# Patient Record
Sex: MALE | Race: OTHER | HISPANIC OR LATINO | URBAN - METROPOLITAN AREA
[De-identification: names, ages, dates, MRNs, and addresses within clinical notes are randomized per-mention and may not be internally consistent; named-entity substitution may affect disease eponyms.]

---
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
## 2023-03-23 ENCOUNTER — EMERGENCY (EMERGENCY)
Facility: HOSPITAL | Age: 18
LOS: 1 days | Discharge: HOME | End: 2023-03-23
Attending: EMERGENCY MEDICINE
Payer: MEDICAID

## 2023-03-23 VITALS
WEIGHT: 236.78 LBS | OXYGEN SATURATION: 98 % | TEMPERATURE: 98 F | SYSTOLIC BLOOD PRESSURE: 145 MMHG | DIASTOLIC BLOOD PRESSURE: 74 MMHG | HEART RATE: 76 BPM | RESPIRATION RATE: 17 BRPM | HEIGHT: 73 IN

## 2023-03-23 PROBLEM — Z78.9 OTHER SPECIFIED HEALTH STATUS: Chronic | Status: ACTIVE | Noted: 2022-08-05

## 2023-03-23 PROCEDURE — 99283 EMERGENCY DEPT VISIT LOW MDM: CPT

## 2023-03-23 RX ORDER — AMOXICILLIN 250 MG/5ML
1 SUSPENSION, RECONSTITUTED, ORAL (ML) ORAL
Qty: 10 | Refills: 0
Start: 2023-03-23 | End: 2023-03-27

## 2023-03-23 NOTE — ED PROVIDER NOTE - NS ED ATTENDING STATEMENT MOD
This was a shared visit with the GRABIEL. I reviewed and verified the documentation and independently performed the documented:

## 2023-03-23 NOTE — ED ADULT NURSE NOTE - NS ED NURSE LEVEL OF CONSCIOUSNESS SPEECH
well developed, well nourished , in no acute distress , ambulating without difficulty , normal communication ability Speaking Coherently

## 2023-03-23 NOTE — ED PROVIDER NOTE - ATTENDING APP SHARED VISIT CONTRIBUTION OF CARE
Agree with NP H&P.  17 year old male with no significant PMHx presents to ED with R ear pain x4 days.  States he has a fullness in his ear and associated with coughing.  No fevers, chills or other complaints.  concern for possible otitis media.  Will dc with abx and PMD follow up.

## 2023-03-23 NOTE — ED PROVIDER NOTE - OBJECTIVE STATEMENT
17 year old male no signficant past history presents with mother for R ear pain x4 days.  States he has a fullness in his ear and associated with coughing.  No fevers, chills or other complaints.

## 2023-03-23 NOTE — ED PROVIDER NOTE - PATIENT PORTAL LINK FT
Patient tolerated procedure well.
You can access the FollowMyHealth Patient Portal offered by [facility] by registering at the following website: [email or web address]. By joining [facility]’s FollowMyHealth portal, you will also be able to view your health information using other applications (apps) compatible with our system.

## 2023-03-23 NOTE — ED PROVIDER NOTE - CLINICAL SUMMARY MEDICAL DECISION MAKING FREE TEXT BOX
Patient well appearing with redness in right TM.  Will treat for otitis media.  No malignant otitis externa, perforation of TM or other concerning diagnosis.  Return precautions provided and outpatient pmd f/u.  Patient agreeable.

## 2023-03-23 NOTE — ED PROVIDER NOTE - PHYSICAL EXAMINATION
GEN:   comfortable, in no apparent distress, AOx3  EYES:   PERRL, extra-occular movements intact  HEENT:   airway patent, moist mucosal membranes, uvula midline.  L ear normal.  R ear canal no inflammation, mild redness to TM of right ear.  CV:  RRR, Pulses- Radial: 2+ bilateral and equal  RESP:   non-labored, speaking in full sentences  MSK:   no musculoskeletal tenderness, 5/5 strength, moving all extremities  SKIN:   dry, intact, no rash  NEURO:   AOx3, no focal weakness or loss of sensation, gait normal, GCS 15  PSYCH: calm, cooperative, no apparent risk to self and others

## 2024-09-13 ENCOUNTER — EMERGENCY (EMERGENCY)
Facility: HOSPITAL | Age: 19
LOS: 1 days | Discharge: HOME | End: 2024-09-13
Attending: EMERGENCY MEDICINE
Payer: MEDICAID

## 2024-09-13 VITALS
TEMPERATURE: 98 F | DIASTOLIC BLOOD PRESSURE: 81 MMHG | SYSTOLIC BLOOD PRESSURE: 122 MMHG | RESPIRATION RATE: 18 BRPM | OXYGEN SATURATION: 98 % | HEART RATE: 86 BPM | WEIGHT: 250 LBS

## 2024-09-13 PROCEDURE — 90471 IMMUNIZATION ADMIN: CPT

## 2024-09-13 PROCEDURE — 12001 RPR S/N/AX/GEN/TRNK 2.5CM/<: CPT

## 2024-09-13 PROCEDURE — 73140 X-RAY EXAM OF FINGER(S): CPT

## 2024-09-13 PROCEDURE — 73140 X-RAY EXAM OF FINGER(S): CPT | Mod: 26,LT

## 2024-09-13 PROCEDURE — 90715 TDAP VACCINE 7 YRS/> IM: CPT

## 2024-09-13 PROCEDURE — 99283 EMERGENCY DEPT VISIT LOW MDM: CPT | Mod: 25

## 2024-09-13 PROCEDURE — 99284 EMERGENCY DEPT VISIT MOD MDM: CPT | Mod: 25

## 2024-09-13 RX ORDER — IBUPROFEN 600 MG
1 TABLET ORAL
Qty: 20 | Refills: 0
Start: 2024-09-13 | End: 2024-09-17

## 2024-09-13 RX ORDER — TETANUS TOXOID, REDUCED DIPHTHERIA TOXOID AND ACELLULAR PERTUSSIS VACCINE, ADSORBED 5; 2.5; 8; 8; 2.5 [IU]/.5ML; [IU]/.5ML; UG/.5ML; UG/.5ML; UG/.5ML
0.5 SUSPENSION INTRAMUSCULAR ONCE
Refills: 0 | Status: COMPLETED | OUTPATIENT
Start: 2024-09-13 | End: 2024-09-13

## 2024-09-13 RX ORDER — IBUPROFEN 600 MG
400 TABLET ORAL ONCE
Refills: 0 | Status: COMPLETED | OUTPATIENT
Start: 2024-09-13 | End: 2024-09-13

## 2024-09-13 RX ADMIN — TETANUS TOXOID, REDUCED DIPHTHERIA TOXOID AND ACELLULAR PERTUSSIS VACCINE, ADSORBED 0.5 MILLILITER(S): 5; 2.5; 8; 8; 2.5 SUSPENSION INTRAMUSCULAR at 03:06

## 2024-09-13 RX ADMIN — Medication 400 MILLIGRAM(S): at 03:02

## 2024-09-13 NOTE — ED ADULT NURSE NOTE - OBJECTIVE STATEMENT
Pt c/o pain has a laceration to left thumb. Pt stated "About a hour ago I was cutting some chilli's cooking and I cut my thumb.

## 2024-09-13 NOTE — ED PROVIDER NOTE - CLINICAL SUMMARY MEDICAL DECISION MAKING FREE TEXT BOX
19-year-old male who sustained a laceration of the left thumb while cutting vegetables with knife.  Unknown last tetanus.  Administered tetanus shot.  Laceration repaired, see procedural note.  X-ray shows no foreign body and no acute fractures.  Patient was given good return instructions.  Will have sutures removed in 1 week.

## 2024-09-13 NOTE — ED PROVIDER NOTE - OBJECTIVE STATEMENT
19-year-old male presents for evaluation of a laceration through the left thumb sustained while cutting vegetables this evening.  Unknown last tetanus.  Bleeding controlled.

## 2024-09-13 NOTE — ED ADULT NURSE NOTE - PAIN RATING/NUMBER SCALE (0-10): ACTIVITY
He has no symptoms of claudication, he is limited mostly because of arthritis in his knee.   4 (moderate pain)

## 2024-09-13 NOTE — ED PROVIDER NOTE - NSFOLLOWUPINSTRUCTIONS_ED_ALL_ED_FT
You were evaluated for a laceration of the left thumb which was repaired with sutures.  Evaluate wound daily for signs of infection including increased redness, pain, drainage, fever.  If you experience any of these return for further evaluation and management.  Go to urgent care, return here or follow-up with your primary care provider for suture removal in 7 days.  Take medications as prescribed for pain or discomfort.    Laceration    A laceration is a cut that goes through all of the layers of the skin and into the tissue that is right under the skin. Some lacerations heal on their own. Others need to be closed with skin adhesive strips, skin glue, stitches (sutures), or staples. Proper laceration care minimizes the risk of infection and helps the laceration to heal better.  If non-absorbable stitches or staples have been placed, they must be taken out within the time frame instructed by your healthcare provider.    SEEK IMMEDIATE MEDICAL CARE IF YOU HAVE ANY OF THE FOLLOWING SYMPTOMS: swelling around the wound, worsening pain, drainage from the wound, red streaking going away from your wound, inability to move finger or toe near the laceration, or discoloration of skin near the laceration.

## 2024-09-13 NOTE — ED ADULT NURSE NOTE - NSFALLUNIVINTERV_ED_ALL_ED
Bed/Stretcher in lowest position, wheels locked, appropriate side rails in place/Call bell, personal items and telephone in reach/Instruct patient to call for assistance before getting out of bed/chair/stretcher/Non-slip footwear applied when patient is off stretcher/[place] to call system/Physically safe environment - no spills, clutter or unnecessary equipment/Purposeful proactive rounding/Room/bathroom lighting operational, light cord in reach

## 2024-09-13 NOTE — ED ADULT NURSE NOTE - NSFALLRISKASMT_ED_ALL_ED_DT
----- Message from Lilly Johnston sent at 1/11/2017 11:39 AM CST -----  Contact: Lashay from [facility]  Calling to state weight 170 pounds this morning; on 12/26/16 179 pounds; 12/24/16 180 pounds; 2 plus pitting edema to left foot; valve clinic on Monday 1/16/17; wound has opened on left index but calling wound care clinic for orders; 302 blood glucose this morniing; patient does forgets about testing and taking medication for glucose levels. Please call [phone]. Thanks!    13-Sep-2024 02:07

## 2024-09-13 NOTE — ED PROVIDER NOTE - PHYSICAL EXAMINATION
General: Patient well appearing, vital signs within normal limits  HEENT: MMM, trachea midline  Respiratory: No respiratory distress  Cardiovascular: Less than 2-second capillary refill of the distal digit  Neuro: Moves all extremities, sensation grossly intact at the distal left thumb  MSK: Left upper extremity, left thumb: Laceration measuring approximately 1 cm through the volar aspect of the distal third arm and through the lateral nail fold into the nail  Skin:  see above

## 2024-09-13 NOTE — ED PROVIDER NOTE - PATIENT PORTAL LINK FT
You can access the FollowMyHealth Patient Portal offered by [facility] by registering at the following website: [email or web address]. By joining [facility]’s FollowMyHealth portal, you will also be able to view your health information using other applications (apps) compatible with our system.

## 2024-09-25 ENCOUNTER — EMERGENCY (EMERGENCY)
Facility: HOSPITAL | Age: 19
LOS: 1 days | Discharge: HOME | End: 2024-09-25
Attending: EMERGENCY MEDICINE
Payer: MEDICAID

## 2024-09-25 VITALS
OXYGEN SATURATION: 97 % | RESPIRATION RATE: 18 BRPM | HEART RATE: 69 BPM | DIASTOLIC BLOOD PRESSURE: 87 MMHG | SYSTOLIC BLOOD PRESSURE: 134 MMHG | WEIGHT: 250 LBS | HEIGHT: 72 IN | TEMPERATURE: 98 F

## 2024-09-25 PROCEDURE — G0463: CPT

## 2024-09-25 PROCEDURE — L9995: CPT

## 2024-11-13 ENCOUNTER — EMERGENCY (EMERGENCY)
Facility: HOSPITAL | Age: 19
LOS: 1 days | Discharge: HOME | End: 2024-11-13
Attending: STUDENT IN AN ORGANIZED HEALTH CARE EDUCATION/TRAINING PROGRAM
Payer: MEDICAID

## 2024-11-13 VITALS
HEIGHT: 72 IN | TEMPERATURE: 99 F | OXYGEN SATURATION: 97 % | WEIGHT: 250 LBS | SYSTOLIC BLOOD PRESSURE: 133 MMHG | DIASTOLIC BLOOD PRESSURE: 73 MMHG | HEART RATE: 98 BPM | RESPIRATION RATE: 19 BRPM

## 2024-11-13 PROCEDURE — 99284 EMERGENCY DEPT VISIT MOD MDM: CPT

## 2024-11-13 PROCEDURE — 99282 EMERGENCY DEPT VISIT SF MDM: CPT

## 2024-11-13 RX ORDER — LEVOCETIRIZINE DIHYDROCHLORIDE 5 MG/1
1 TABLET ORAL
Qty: 30 | Refills: 0
Start: 2024-11-13 | End: 2024-12-12

## 2024-11-13 RX ORDER — IPRATROPIUM BROMIDE 42 UG/1
2 SPRAY NASAL
Qty: 1 | Refills: 0
Start: 2024-11-13 | End: 2024-11-16

## 2024-11-15 ENCOUNTER — EMERGENCY (EMERGENCY)
Facility: HOSPITAL | Age: 19
LOS: 1 days | Discharge: HOME | End: 2024-11-15
Attending: EMERGENCY MEDICINE
Payer: MEDICAID

## 2024-11-15 VITALS
DIASTOLIC BLOOD PRESSURE: 72 MMHG | HEART RATE: 82 BPM | OXYGEN SATURATION: 98 % | RESPIRATION RATE: 16 BRPM | HEIGHT: 72 IN | WEIGHT: 250 LBS | TEMPERATURE: 98 F | SYSTOLIC BLOOD PRESSURE: 113 MMHG

## 2024-11-15 PROCEDURE — 99283 EMERGENCY DEPT VISIT LOW MDM: CPT

## 2024-11-15 PROCEDURE — 99282 EMERGENCY DEPT VISIT SF MDM: CPT

## 2024-11-15 NOTE — ED PROVIDER NOTE - CLINICAL SUMMARY MEDICAL DECISION MAKING FREE TEXT BOX
19 yr old male with hx of asthma presents to ed c/o fever tmax 102 with persistent cough and chest congestion x 4 days. pt was seen here 2 days ago and given albuterol, promethazine liquid and levocetirizine. pt just got noticed promethazine is available. admits myalgia gone now. no rash, no dysuria, no abd pain. no sob    continue with upper respiratory treatment- no clinical sing to suggest alternative cause of fever.

## 2024-11-15 NOTE — ED PROVIDER NOTE - NSFOLLOWUPINSTRUCTIONS_ED_ALL_ED_FT
upper respiratory infection. symptomatic management. ibuprofen 600mg every 6 hrs and/or tylenol 650mg every 4 hrs as needed. stay hydrated. rest. usually worse day 3-4 and improves after 7-10 days. see your MD. return if worsens.    cough at times can last up to 3 weeks. can use honey or robitussin. humidifier. if fever continues over 4 weeks and no other source then more testing should be done.     infección de las vías respiratorias superiores. Manejo sintomático. ibuprofeno 600 mg cada 6 horas y/o tylenol 650 mg cada 4 horas según sea necesario. mantente hidratado. descansar. Generalmente empeora los días 3-4 y mejora después de 7-10 días. consulte a christie médico. regresar si empeora.    la tos a veces puede durar hasta 3 semanas. Puedes usar miel o robitussin. humidificador. Si la fiebre continúa rosendo 4 semanas y no hay otra deidre, se deben realizar más pruebas.
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
## 2024-11-15 NOTE — ED PROVIDER NOTE - ENMT, MLM
Airway patent, Nasal mucosa clear. Mouth with normal mucosa. Throat has no vesicles, no oropharyngeal exudates and uvula is midline. normal tonsils. no sinus tenderness

## 2024-11-15 NOTE — ED PROVIDER NOTE - OBJECTIVE STATEMENT
19 yr old male with hx of asthma presents to ed c/o fever tmax 102 with persistent cough and chest congestion x 4 days. pt was seen here 2 days ago and given albuterol, promethazine liquid and levocetirizine. pt just got noticed promethazine is available. admits myalgia gone now. no rash, no dysuria, no abd pain. no sob

## 2024-11-15 NOTE — ED PROVIDER NOTE - PATIENT PORTAL LINK FT
You can access the FollowMyHealth Patient Portal offered by [facility] by registering at the following website: [email or web address]. By joining [facility]’s FollowMyHealth portal, you will also be able to view your health information using other applications (apps) compatible with our system.

## 2025-04-02 ENCOUNTER — EMERGENCY (EMERGENCY)
Facility: HOSPITAL | Age: 20
LOS: 1 days | Discharge: HOME | End: 2025-04-02
Attending: EMERGENCY MEDICINE
Payer: SELF-PAY

## 2025-04-02 VITALS
TEMPERATURE: 98 F | RESPIRATION RATE: 14 BRPM | OXYGEN SATURATION: 99 % | WEIGHT: 268.74 LBS | SYSTOLIC BLOOD PRESSURE: 138 MMHG | DIASTOLIC BLOOD PRESSURE: 81 MMHG | HEART RATE: 91 BPM

## 2025-04-02 PROCEDURE — 99283 EMERGENCY DEPT VISIT LOW MDM: CPT

## 2025-04-02 PROCEDURE — 99282 EMERGENCY DEPT VISIT SF MDM: CPT

## 2025-04-03 ENCOUNTER — EMERGENCY (EMERGENCY)
Facility: HOSPITAL | Age: 20
LOS: 1 days | Discharge: HOME | End: 2025-04-03
Attending: STUDENT IN AN ORGANIZED HEALTH CARE EDUCATION/TRAINING PROGRAM
Payer: SELF-PAY

## 2025-04-03 VITALS
TEMPERATURE: 98 F | HEART RATE: 73 BPM | RESPIRATION RATE: 18 BRPM | WEIGHT: 268.3 LBS | DIASTOLIC BLOOD PRESSURE: 70 MMHG | SYSTOLIC BLOOD PRESSURE: 128 MMHG | OXYGEN SATURATION: 98 %

## 2025-04-03 PROCEDURE — 73130 X-RAY EXAM OF HAND: CPT

## 2025-04-03 PROCEDURE — 99283 EMERGENCY DEPT VISIT LOW MDM: CPT | Mod: 25

## 2025-04-03 PROCEDURE — 99284 EMERGENCY DEPT VISIT MOD MDM: CPT

## 2025-04-03 PROCEDURE — 73130 X-RAY EXAM OF HAND: CPT | Mod: 26,LT

## 2025-04-03 RX ORDER — AMOXICILLIN AND CLAVULANATE POTASSIUM 500; 125 MG/1; MG/1
875 TABLET, FILM COATED ORAL
Qty: 14 | Refills: 0
Start: 2025-04-03 | End: 2025-04-09

## 2025-04-03 RX ORDER — IBUPROFEN 200 MG
600 TABLET ORAL ONCE
Refills: 0 | Status: COMPLETED | OUTPATIENT
Start: 2025-04-03 | End: 2025-04-03

## 2025-04-03 RX ADMIN — Medication 600 MILLIGRAM(S): at 18:43

## 2025-04-03 NOTE — ED PROVIDER NOTE - OBJECTIVE STATEMENT
19-year-old male with a past medical history of asthma presents with left second finger pain that began yesterday while playing soccer.  Patient reports that he fell onto the hand.  Took Tylenol at home with minimal relief.

## 2025-04-03 NOTE — ED ADULT NURSE NOTE - OBJECTIVE STATEMENT
Patient c/o left index finger pain s/p playing sports x1 day ago. Pt denies any head injury. No bleeding noted.

## 2025-04-03 NOTE — ED PROVIDER NOTE - PROGRESS NOTE DETAILS
X-rays negative.  Given erythema extending down the finger will treat for possible early cellulitis.  Will patient follow-up with PCP. Pt is well appearing walking with steady gait, stable for discharge and follow up without fail with medical doctor. I had a detailed discussion with the patient and/or guardian regarding the historical points, exam findings, and any diagnostic results supporting the discharge diagnosis. Pt educated on care and need for follow up. Strict return instructions and red flag signs and symptoms discussed with patient. Questions answered. Pt shows understanding of discharge information and agrees to follow.

## 2025-04-03 NOTE — ED PROVIDER NOTE - PATIENT PORTAL LINK FT
You can access the FollowMyHealth Patient Portal offered by [facility] by registering at the following website: [email or web address]. By joining [email or web address]’s FollowMyHealth portal, you will also be able to view your health information using other applications (apps) compatible with our system.

## 2025-04-03 NOTE — ED PROVIDER NOTE - CLINICAL SUMMARY MEDICAL DECISION MAKING FREE TEXT BOX
19-year-old male with a past medical history of asthma presents with left second finger pain that began yesterday while playing soccer.  Patient reports that he fell onto the hand.  Took Tylenol at home with minimal relief.    Will obtain xray to r/o fracture. Medications for pain.

## 2025-04-03 NOTE — ED ADULT NURSE NOTE - NSFALLUNIVINTERV_ED_ALL_ED
Bed/Stretcher in lowest position, wheels locked, appropriate side rails in place/Call bell, personal items and telephone in reach/Instruct patient to call for assistance before getting out of bed/chair/stretcher/Non-slip footwear applied when patient is off stretcher/[place] to call system/Physically safe environment - no spills, clutter or unnecessary equipment/Purposeful proactive rounding/Room/bathroom lighting operational, light cord in reach

## 2025-04-03 NOTE — ED PROVIDER NOTE - PHYSICAL EXAMINATION
Left hand - +TTP at PIP joint. +abrasion noted to PIP joint. +erythema noted to ringer extending into hand. Decreased ROM of PIP and DIP joints. Full ROM at MCP joint.

## 2025-04-03 NOTE — ED PROVIDER NOTE - NSFOLLOWUPINSTRUCTIONS_ED_ALL_ED_FT
Follow up with your primary care doctor within 1 week.     Antibiotics sent to the pharmacy to prevent a hand infection.  Take the medication as directed and until all the medication is completed, even if you are feeling better.    If the redness extends outside of the purple marking or you develop a fever, please return to the emergency room.    You can take Motrin (ibuprofen) 600 mg every 6 hours or Tylenol (acetaminophen) 1000 mg every 6 hours as needed for pain or fever.     If you experience any new or worsening symptoms or if you are concerned you can always come back to the emergency for a re-evaluation.